# Patient Record
Sex: FEMALE | NOT HISPANIC OR LATINO
[De-identification: names, ages, dates, MRNs, and addresses within clinical notes are randomized per-mention and may not be internally consistent; named-entity substitution may affect disease eponyms.]

---

## 2017-01-13 ENCOUNTER — BH HISTORICAL (OUTPATIENT)
Dept: OTHER | Age: 37
End: 2017-01-13

## 2017-01-27 ENCOUNTER — BH HISTORICAL (OUTPATIENT)
Dept: OTHER | Age: 37
End: 2017-01-27

## 2017-02-26 ENCOUNTER — CHARTING TRANS (OUTPATIENT)
Dept: URGENT CARE | Age: 37
End: 2017-02-26

## 2017-02-26 ASSESSMENT — PAIN SCALES - GENERAL: PAINLEVEL_OUTOF10: 7

## 2017-03-16 ENCOUNTER — BH HISTORICAL (OUTPATIENT)
Dept: OTHER | Age: 37
End: 2017-03-16

## 2017-03-25 ENCOUNTER — CHARTING TRANS (OUTPATIENT)
Dept: URGENT CARE | Age: 37
End: 2017-03-25

## 2017-04-24 ENCOUNTER — BH HISTORICAL (OUTPATIENT)
Dept: OTHER | Age: 37
End: 2017-04-24

## 2017-04-25 ENCOUNTER — CHARTING TRANS (OUTPATIENT)
Dept: INTERNAL MEDICINE | Age: 37
End: 2017-04-25

## 2017-04-25 ENCOUNTER — BH HISTORICAL (OUTPATIENT)
Dept: OTHER | Age: 37
End: 2017-04-25

## 2017-05-09 ENCOUNTER — CHARTING TRANS (OUTPATIENT)
Dept: OTHER | Age: 37
End: 2017-05-09

## 2017-05-17 ENCOUNTER — CHARTING TRANS (OUTPATIENT)
Dept: OTHER | Age: 37
End: 2017-05-17

## 2017-05-17 ENCOUNTER — BH HISTORICAL (OUTPATIENT)
Dept: OTHER | Age: 37
End: 2017-05-17

## 2017-07-13 ENCOUNTER — BH HISTORICAL (OUTPATIENT)
Dept: OTHER | Age: 37
End: 2017-07-13

## 2017-08-15 ENCOUNTER — BH HISTORICAL (OUTPATIENT)
Dept: OTHER | Age: 37
End: 2017-08-15

## 2017-08-19 ENCOUNTER — BH HISTORICAL (OUTPATIENT)
Dept: OTHER | Age: 37
End: 2017-08-19

## 2017-08-25 ENCOUNTER — BH HISTORICAL (OUTPATIENT)
Dept: OTHER | Age: 37
End: 2017-08-25

## 2017-09-08 ENCOUNTER — LAB SERVICES (OUTPATIENT)
Dept: OTHER | Age: 37
End: 2017-09-08

## 2017-09-08 ENCOUNTER — CHARTING TRANS (OUTPATIENT)
Dept: INTERNAL MEDICINE | Age: 37
End: 2017-09-08

## 2017-09-08 LAB
BILIRUBIN URINE: NEGATIVE
BLOOD URINE: ABNORMAL
CLARITY: CLEAR
COLOR: YELLOW
GLUCOSE QUALITATIVE U: NEGATIVE
KETONES, URINE: NEGATIVE
LEUKOCYTE ESTERASE URINE: NEGATIVE
NITRITE URINE: NEGATIVE
PH URINE: 6 (ref 5–7)
SPECIFIC GRAVITY URINE: 1.02 (ref 1–1.03)
URINE PROTEIN, QUAL (DIPSTICK): ABNORMAL
UROBILINOGEN URINE: 0.2

## 2017-09-11 LAB — FINAL REPORT: NORMAL

## 2017-10-05 ENCOUNTER — BH HISTORICAL (OUTPATIENT)
Dept: OTHER | Age: 37
End: 2017-10-05

## 2017-10-12 ENCOUNTER — BH HISTORICAL (OUTPATIENT)
Dept: OTHER | Age: 37
End: 2017-10-12

## 2017-10-26 ENCOUNTER — HOSPITAL ENCOUNTER (OUTPATIENT)
Age: 37
Discharge: HOME | End: 2017-10-26
Attending: FAMILY MEDICINE
Payer: COMMERCIAL

## 2017-10-26 VITALS
SYSTOLIC BLOOD PRESSURE: 136 MMHG | TEMPERATURE: 98 F | DIASTOLIC BLOOD PRESSURE: 80 MMHG | OXYGEN SATURATION: 96 % | HEART RATE: 110 BPM | RESPIRATION RATE: 18 BRPM

## 2017-10-26 DIAGNOSIS — J20.9 ACUTE BRONCHITIS, UNSPECIFIED ORGANISM: Primary | ICD-10-CM

## 2017-10-26 PROCEDURE — 99203 OFFICE O/P NEW LOW 30 MIN: CPT

## 2017-10-26 PROCEDURE — 99204 OFFICE O/P NEW MOD 45 MIN: CPT

## 2017-10-26 RX ORDER — PREGABALIN 150 MG/1
150 CAPSULE ORAL 2 TIMES DAILY
COMMUNITY
End: 2021-04-05

## 2017-10-26 RX ORDER — AZITHROMYCIN 250 MG/1
TABLET, FILM COATED ORAL
Qty: 6 TABLET | Refills: 0 | Status: SHIPPED | OUTPATIENT
Start: 2017-10-26 | End: 2018-07-31

## 2017-10-26 RX ORDER — ALBUTEROL SULFATE 90 UG/1
2 AEROSOL, METERED RESPIRATORY (INHALATION) EVERY 6 HOURS PRN
Qty: 1 INHALER | Refills: 0 | Status: SHIPPED | OUTPATIENT
Start: 2017-10-26 | End: 2017-10-26

## 2017-10-26 RX ORDER — CYCLOBENZAPRINE HYDROCHLORIDE 15 MG/1
15 CAPSULE, EXTENDED RELEASE ORAL
COMMUNITY
End: 2018-07-31

## 2017-10-26 RX ORDER — PREDNISONE 20 MG/1
40 TABLET ORAL DAILY
Qty: 10 TABLET | Refills: 0 | Status: SHIPPED | OUTPATIENT
Start: 2017-10-26 | End: 2017-10-31

## 2017-10-26 RX ORDER — DULOXETIN HYDROCHLORIDE 30 MG/1
90 CAPSULE, DELAYED RELEASE ORAL DAILY
COMMUNITY
End: 2018-07-31

## 2017-10-26 RX ORDER — ALBUTEROL SULFATE 90 UG/1
2 AEROSOL, METERED RESPIRATORY (INHALATION) EVERY 4 HOURS PRN
Qty: 1 INHALER | Refills: 6 | Status: SHIPPED | OUTPATIENT
Start: 2017-10-26 | End: 2017-11-05

## 2017-10-26 RX ORDER — CEFUROXIME AXETIL 500 MG/1
500 TABLET ORAL 2 TIMES DAILY
Qty: 14 TABLET | Refills: 0 | Status: SHIPPED | OUTPATIENT
Start: 2017-10-26 | End: 2017-10-26 | Stop reason: ALTCHOICE

## 2017-10-27 NOTE — ED PROVIDER NOTES
Patient Seen in: [id number] [facility] [place]    History   Patient presents with:  Chest Congestion  Cough/URI    Stated Complaint: Cough,Chest Congestion, Fatgiue    HPI    25-year-old female presents today with complaints of chest congestion a normal. No drainage or sinus tenderness. . No nasal flaring  Throat: lips, mucosa, and tongue normal; teeth and gums normal  Neck: no adenopathy, supple, no bruits  Lungs: rhonchi heard in both lung fields. No wheezing  or crackles.   No accessory muscle use

## 2017-10-27 NOTE — ED INITIAL ASSESSMENT (HPI)
Pt sts began with cough/URI on Tuesday. No known fever but feels warm at times. Cough is moist/nonproductive. Feels SOB mainly with activity.

## 2017-12-18 ENCOUNTER — BH HISTORICAL (OUTPATIENT)
Dept: OTHER | Age: 37
End: 2017-12-18

## 2018-01-24 ENCOUNTER — BH HISTORICAL (OUTPATIENT)
Dept: OTHER | Age: 38
End: 2018-01-24

## 2018-03-09 ENCOUNTER — BH HISTORICAL (OUTPATIENT)
Dept: OTHER | Age: 38
End: 2018-03-09

## 2018-03-15 ENCOUNTER — BH HISTORICAL (OUTPATIENT)
Dept: OTHER | Age: 38
End: 2018-03-15

## 2018-04-07 ENCOUNTER — BH HISTORICAL (OUTPATIENT)
Dept: OTHER | Age: 38
End: 2018-04-07

## 2018-05-08 ENCOUNTER — BH HISTORICAL (OUTPATIENT)
Dept: OTHER | Age: 38
End: 2018-05-08

## 2018-07-03 ENCOUNTER — BH HISTORICAL (OUTPATIENT)
Dept: OTHER | Age: 38
End: 2018-07-03

## 2018-07-31 PROBLEM — F32.9 MAJOR DEPRESSIVE DISORDER: Status: ACTIVE | Noted: 2018-07-31

## 2018-07-31 PROBLEM — M79.7 FIBROMYALGIA: Status: ACTIVE | Noted: 2018-07-31

## 2018-11-28 VITALS
RESPIRATION RATE: 14 BRPM | HEART RATE: 88 BPM | TEMPERATURE: 98.4 F | SYSTOLIC BLOOD PRESSURE: 126 MMHG | WEIGHT: 183 LBS | BODY MASS INDEX: 31.24 KG/M2 | HEIGHT: 64 IN | DIASTOLIC BLOOD PRESSURE: 80 MMHG

## 2018-11-28 VITALS
HEART RATE: 108 BPM | WEIGHT: 182 LBS | HEIGHT: 64 IN | BODY MASS INDEX: 31.07 KG/M2 | TEMPERATURE: 97.3 F | DIASTOLIC BLOOD PRESSURE: 80 MMHG | SYSTOLIC BLOOD PRESSURE: 140 MMHG

## 2018-11-28 VITALS
SYSTOLIC BLOOD PRESSURE: 118 MMHG | OXYGEN SATURATION: 96 % | DIASTOLIC BLOOD PRESSURE: 76 MMHG | RESPIRATION RATE: 16 BRPM | TEMPERATURE: 97.3 F | HEART RATE: 108 BPM

## 2018-11-28 VITALS
DIASTOLIC BLOOD PRESSURE: 78 MMHG | HEART RATE: 112 BPM | RESPIRATION RATE: 16 BRPM | OXYGEN SATURATION: 97 % | TEMPERATURE: 99 F | SYSTOLIC BLOOD PRESSURE: 124 MMHG

## 2021-02-25 PROBLEM — G47.33 OBSTRUCTIVE SLEEP APNEA: Status: ACTIVE | Noted: 2021-02-25

## 2021-03-19 PROBLEM — F90.8 ATTENTION DEFICIT HYPERACTIVITY DISORDER (ADHD), OTHER TYPE: Status: ACTIVE | Noted: 2021-03-19

## 2023-03-21 ENCOUNTER — TELEMEDICINE (OUTPATIENT)
Dept: TELEHEALTH | Age: 43
End: 2023-03-21
Payer: COMMERCIAL

## 2023-03-21 DIAGNOSIS — J04.0 LARYNGITIS: ICD-10-CM

## 2023-03-21 DIAGNOSIS — J06.9 VIRAL URI: ICD-10-CM

## 2023-03-21 DIAGNOSIS — H10.021 MUCOPURULENT CONJUNCTIVITIS OF RIGHT EYE: Primary | ICD-10-CM

## 2023-03-21 PROCEDURE — 99213 OFFICE O/P EST LOW 20 MIN: CPT | Performed by: PHYSICIAN ASSISTANT

## 2023-03-21 RX ORDER — POLYMYXIN B SULFATE AND TRIMETHOPRIM 1; 10000 MG/ML; [USP'U]/ML
1 SOLUTION OPHTHALMIC EVERY 6 HOURS
Qty: 10 ML | Refills: 0 | Status: SHIPPED | OUTPATIENT
Start: 2023-03-21 | End: 2023-03-28

## 2023-03-25 ENCOUNTER — HOSPITAL ENCOUNTER (OUTPATIENT)
Age: 43
Discharge: HOME | End: 2023-03-25
Payer: COMMERCIAL

## 2023-03-25 ENCOUNTER — TELEMEDICINE (OUTPATIENT)
Dept: TELEHEALTH | Age: 43
End: 2023-03-25

## 2023-03-25 VITALS
BODY MASS INDEX: 37.05 KG/M2 | DIASTOLIC BLOOD PRESSURE: 100 MMHG | TEMPERATURE: 97 F | SYSTOLIC BLOOD PRESSURE: 142 MMHG | WEIGHT: 217 LBS | HEART RATE: 103 BPM | RESPIRATION RATE: 20 BRPM | HEIGHT: 64 IN | OXYGEN SATURATION: 98 %

## 2023-03-25 DIAGNOSIS — J02.9 ACUTE VIRAL PHARYNGITIS: Primary | ICD-10-CM

## 2023-03-25 DIAGNOSIS — Z02.9 ENCOUNTER FOR ADMINISTRATIVE EXAMINATIONS, UNSPECIFIED: Primary | ICD-10-CM

## 2023-03-25 DIAGNOSIS — T78.49XA ALLERGIC REACTION TO HAIR DYE: ICD-10-CM

## 2023-03-25 LAB — S PYO AG THROAT QL: NEGATIVE

## 2023-03-25 PROCEDURE — 87880 STREP A ASSAY W/OPTIC: CPT | Performed by: NURSE PRACTITIONER

## 2023-03-25 PROCEDURE — 99203 OFFICE O/P NEW LOW 30 MIN: CPT | Performed by: NURSE PRACTITIONER

## 2023-03-25 RX ORDER — METHYLPREDNISOLONE 4 MG/1
TABLET ORAL
Qty: 1 EACH | Refills: 0 | Status: SHIPPED | OUTPATIENT
Start: 2023-03-25

## 2023-03-25 NOTE — PROGRESS NOTES
PT reporting hives on face and sore throat X 2-3 days. Will need further investigation and possibly strep testing as well. Pt will go to [facility] for treatment today. Discussed importance of treatment today and without delay. No charge.

## 2023-03-25 NOTE — ED INITIAL ASSESSMENT (HPI)
Pt had her hair dyed a week ago. Patient noticed the next day she had an itchy scalp and hives started developing on her scalp and face. Patient then developed a sore throat that is worsening.

## 2023-03-25 NOTE — DISCHARGE INSTRUCTIONS
Follow-up the [facility] is involved healthcare needs  Take the Medrol Dosepak started by the pharmacy  Over-the-counter Benadryl not can help  During the daytime take over-the-counter Zyrtec  Avoid any hair products or chemicals to the head  Increase with keep well-hydrated gargle warm salt water  Return to the emergency room for worse symptoms or concerns.

## 2023-12-13 ENCOUNTER — HOSPITAL ENCOUNTER (OUTPATIENT)
Age: 43
Discharge: HOME | End: 2023-12-13
Payer: COMMERCIAL

## 2023-12-13 VITALS
HEART RATE: 105 BPM | OXYGEN SATURATION: 98 % | RESPIRATION RATE: 24 BRPM | TEMPERATURE: 98 F | DIASTOLIC BLOOD PRESSURE: 83 MMHG | SYSTOLIC BLOOD PRESSURE: 139 MMHG

## 2023-12-13 DIAGNOSIS — B34.9 VIRAL ILLNESS: Primary | ICD-10-CM

## 2023-12-13 PROCEDURE — 99213 OFFICE O/P EST LOW 20 MIN: CPT | Performed by: NURSE PRACTITIONER

## 2023-12-13 RX ORDER — ALBUTEROL SULFATE 90 UG/1
2 AEROSOL, METERED RESPIRATORY (INHALATION) EVERY 4 HOURS PRN
Qty: 1 EACH | Refills: 0 | Status: SHIPPED | OUTPATIENT
Start: 2023-12-13 | End: 2024-01-12

## 2023-12-13 RX ORDER — BENZONATATE 100 MG/1
100 CAPSULE ORAL 3 TIMES DAILY PRN
Qty: 30 CAPSULE | Refills: 0 | Status: SHIPPED | OUTPATIENT
Start: 2023-12-13 | End: 2024-01-12

## 2023-12-13 RX ORDER — PREDNISONE 20 MG/1
40 TABLET ORAL DAILY
Qty: 10 TABLET | Refills: 0 | Status: SHIPPED | OUTPATIENT
Start: 2023-12-13 | End: 2023-12-18

## 2023-12-13 NOTE — ED INITIAL ASSESSMENT (HPI)
Patient is here with a congested cough since Saturday. She states she started with a fever and fatigue now just has a cough.